# Patient Record
Sex: MALE | Race: WHITE
[De-identification: names, ages, dates, MRNs, and addresses within clinical notes are randomized per-mention and may not be internally consistent; named-entity substitution may affect disease eponyms.]

---

## 2021-05-03 ENCOUNTER — HOSPITAL ENCOUNTER (OUTPATIENT)
Dept: HOSPITAL 96 - M.CRD | Age: 46
End: 2021-05-03
Attending: INTERNAL MEDICINE
Payer: COMMERCIAL

## 2021-05-03 DIAGNOSIS — E78.2: ICD-10-CM

## 2021-05-03 DIAGNOSIS — R07.2: ICD-10-CM

## 2021-05-03 DIAGNOSIS — I25.10: Primary | ICD-10-CM

## 2021-05-03 DIAGNOSIS — I10: ICD-10-CM

## 2021-05-03 NOTE — 2DMMODE
[place] [address]
Phone:  [phone]                     2 D/M-MODE ECHOCARDIOGRAM     
_______________________________________________________________________________
 
Name:         JON MCADAMS Room:                     REG CLI
M.RLisseth#:    [id number]     Account #:     [id number]  
Admission:    21    Attend Phys:   Won Claros,
Discharge:                Date of Birth: [birth year]  
Date of Service: 21 1354  Report #:      [id number]
        [id number]
_______________________________________________________________________________
THIS REPORT FOR:
 
cc:  Virgen Morocho NP, Elizabeth NP Blick, David R. MD [facility]        
                                                                       ~
 
--------------- APPROVED REPORT --------------
 
 
Study performed:  2021 07:47:35
 
EXAM: Comprehensive 2D, Doppler, and color-flow 
Echocardiogram 
Patient Location: Out-Patient   
 
      BSA:         2.13
HR: 60 bpm BP:          130/82 mmHg 
 
Other Information 
Study Quality: Excellent
 
Indications
CAD
Chest Pain
 
2D Dimensions
IVSd:  11.29 (7-11mm) LVOT Diam:  20.63 (18-24mm) 
LVDd:  44.02 mm  
PWd:  10.04 (7-11mm) Ascending Ao:  31.56 (22-36mm)
LVDs:  25.87 (25-40mm) 
Aortic Root:  33.07 mm 
 
Volumes
Left Atrial Volume (Systole) 
    LA ESV Index:  17.00 mL/m2
 
Aortic Valve
AoV Peak Amador.:  1.47 m/s 
AO Peak Gr.:  8.68 mmHg  LVOT Max PG:  3.47 mmHg
AO Mean Gr.:  4.32 mmHg  LVOT Mean P.61 mmHg
    LVOT Max V:  0.93 m/s
AO V2 VTI:  28.88 cm  LVOT Mean V:  0.58 m/s
JOE (VTI):  2.39 cm2  LVOT V1 VTI:  20.64 cm
 
Mitral Valve
 
 
[place] [address]
Phone:  [phone]                     2 D/M-MODE ECHOCARDIOGRAM     
_______________________________________________________________________________
 
Name:         JON MCADAMS Room:                     REG CLI
M.R.#:    [id number]     Account #:     [id number]  
Admission:    21    Attend Phys:   Won Claros,
Discharge:                Date of Birth: [birth year]  
Date of Service: 21 1354  Report #:      [id number]
        [id number]
_______________________________________________________________________________
    E/A Ratio:  0.98
    MV Decel. Time:  203.05 ms
MV E Max Amador.:  0.80 m/s 
MV PHT:  58.88 ms  
MVA (PHT):  3.74 cm2  
 
TDI
E/Lateral E':  6.67 E/Medial E':  8.00
   Medial E' Amador.:  0.10 m/s
   Lateral E' Amador.:  0.12 m/s
 
Pulmonary Valve
PV Peak Amador.:  1.10 m/s PV Peak Gr.:  4.87 mmHg
 
Left Ventricle
The left ventricle is normal size. There is normal LV segmental wall 
motion. There is normal left ventricular wall thickness. Left 
ventricular systolic function is normal. The left ventricular 
ejection fraction is within the normal range. LVEF is 55-60%. Grade I 
- abnormal relaxation pattern.
 
Right Ventricle
The right ventricle is normal size. The right ventricular systolic 
function is normal.
 
Atria
The left atrium size is normal. Atrial septal aneurysm is present. 
Possible PFO is noted. Doppler suggests left to right interatrial 
shunt. The right atrium size is normal.
 
Aortic Valve
The aortic valve is normal in structure. No aortic regurgitation is 
present. There is no aortic valvular stenosis.
 
Mitral Valve
The mitral valve is normal in structure. There is trace mitral valve 
regurgitation noted. No evidence of mitral valve stenosis.
 
Tricuspid Valve
The tricuspid valve is normal in structure. There is trace tricuspid 
valve regurgitation noted.
 
Pulmonic Valve
The pulmonary valve is normal in structure. There is no pulmonic 
valvular regurgitation.
 
 
 
[place] [address]
Phone:  [phone]                     2 D/M-MODE ECHOCARDIOGRAM     
_______________________________________________________________________________
 
Name:         JON MCADAMS Room:                     REG CLI
M.R.#:    [id number]     Account #:     [id number]  
Admission:    21    Attend Phys:   Won Claros,
Discharge:                Date of Birth: [birth year]  
Date of Service: 21 1354  Report #:      [id number]
        [id number]
_______________________________________________________________________________
Great Vessels
The aortic root is normal in size. IVC is normal in size and 
collapses >50% with inspiration.
 
Pericardium
There is no pericardial effusion.
 
<Conclusion>
Atrial septal aneurysm is present.
Possible PFO is noted.
Doppler suggests left to right interatrial shunt.
LVEF is 55-60%.
 
 
 
 
 
 
 
 
 
 
 
 
 
 
 
 
 
 
 
 
 
 
 
 
 
 
 
 
 
 
 
 
  <ELECTRONICALLY SIGNED>
                                           By: Richmond Malloy MD, [facility]      
  21     1354
D: 21 1354   _____________________________________
T: 21 1354   Richmond Malloy MD, FACC        /INF

## 2021-05-03 NOTE — CARDNUC
[place] [address]
Phone:  [phone]                     CARDIAC NUCLEAR IMAGING REPORT
_______________________________________________________________________________
 
Name:         JON MCADAMS Room:                     REG CLI
M.R.#:    [id number]     Account #:     [id number]  
Admission:    05/03/21    Attend Phys:   Won Claros,
Discharge:                Date of Birth: [birth year]  
Date of Service: 05/03/21 1644  Report #:      [id number]
        [id number] 
_______________________________________________________________________________
THIS REPORT FOR:
 
cc:  Virgen Morocho NP, Elizabeth NP Liston, Michael J. MD [facility]     
                                                                       ~
 
--------------- APPROVED REPORT --------------
 
 
Imaging Protocol: Rest Tc-99m/Stress Tc-99m 1 day
Study performed:  05/03/2021 08:30:00
 
Indication: CHEST PAIN, INCREASED CORONARY CALCIUM SCORE.
Patient Location: Out-Patient
Stress Tech: Lisa Casey
Stress Nurse: Kymberly Fonseca RN
NM Tech:KERVIN Mccall
 
Ht: 6 ft 0 in     Wt: 199 lbs    BSA:  2.13 m2
HR: 64 bpm                       BP: 145/104 mmHg          BMI:  
26.98
 
Medical History
Medical History: CAD, INCREASED CORONARY CALCIUM SCORE, CHEST PAIN, 
HTN, HLD, FORMER SMOKER, FHX CAD.
Medications: ATORVASTATIN,LOSARTAN, FISH OIL OMEGA 3S, ASA 
81MG.
Allergies: ZITHROMYACIN, TAMIFLU
Cardiac Risk Factors: Age, Tobacco History (Former), Hyperlipidemia, 
HTN, FHX of CAD, INCREASED CORONARY CALCIUM SCORE-711
Previous Cardiac Procedures: NONE
Pretest Chest Pain Characteristics: No chest pain
Exercise History: Physically active
Physical Disabilities: NONE NOTED
Meds Held (24 hrs): LOSARTAN.
 
Resting Data
Rest SPECT myocardial perfusion imaging was performed in supine 
position 30 minutes following the intravenous injection of 9.9 mCi of 
Tc-99m Sestamibi.
Time of rest injection: 0855     Date: 05/03/2021
The images were gated to evaluate regional wall motion and calculate 
left ventricular ejection fraction. 
Administration Route: IV
Administration Site: Right Hand
 
 
[place] [address]
Phone:  [phone]                     CARDIAC NUCLEAR IMAGING REPORT
_______________________________________________________________________________
 
Name:         JON MCADAMS Room:                     REG CLI
M.R.#:    [id number]     Account #:     [id number]  
Admission:    05/03/21    Attend Phys:   Won Claros,
Discharge:                Date of Birth: [birth year]  
Date of Service: 05/03/21 1644  Report #:      [id number]
        [id number] 
_______________________________________________________________________________
 
Exercise Stress
At peak stress, the patient was injected intravenously with 35.5mCi 
of Tc-99m Sestamibi.
Time of stress injection: 1035     Date: 05/03/2021
Administration Route: IV
Administration Site: Right Hand
Gated Stress SPECT was performed 30 minutes after stress 
injection.
The images were gated to evaluate regional wall motion and calculate 
left ventricular ejection fraction. 
Prone imaging was performed.
 
Stress Test Details
Stress Test:  Exercise stress testing was performed using a Derrick 
protocol.      
 
HR      Max Heart Rate (APMHR): 172 bpm  
Resting HR:            64 bpm   Target HR (85% APMHR): 146 bpm  
Max HR Achieved:  167 bpm        
% of APMHR:         97         
Recovery HR:            93 bpm        
 
BP           
Resting BP:  145/104 mmHg        
Max BP:       213/95 mmHg        
Recovery BP:       144/85 mmHg        
ECG           
Resting ECG:  Sinus Rhythm        
Stress ECG:     Sinus Tachycardia       
ST Change: None          
Arrhythmia:    None         
Recovery ECG: Sinus Rhythm        
Recovery ST Change: None        
Recovery Arrhythmia: None        
 
Clinical
Reason for Termination: Completed protocol, Maximal effort, TARGET HR 
ACHIEVED.
Stress Symptoms: DYSPNEA.
Exercise duration: 13 min 22 sec
Exercise capacity: 14.41 METs
Overall Exercise Capacity for Age: Superior
Functional Aerobic Impairment  97%
Patient had no significant cardiac symptoms with standard Derrick 
protocol exercise. The patient exhibited excellent exercise 
 
 
[place] [address]
Phone:  [phone]                     CARDIAC NUCLEAR IMAGING REPORT
_______________________________________________________________________________
 
Name:         JON MCADAMS Room:                     REG CLI
M.R.#:    [id number]     Account #:     [id number]  
Admission:    05/03/21    Attend Phys:   Won Claros,
Discharge:                Date of Birth: [birth year]  
Date of Service: 05/03/21 1644  Report #:      [id number]
        [id number] 
_______________________________________________________________________________
tolerance.
 
Nurse Comments
A 46 YEAR OLD MALE PRESENTED FOR A TREADMILL NUCLEAR STRESS TEST. 
TREADMILL TOLERATED TO STAGE 5. RECOVERY UNREMARKABLE. PATIENT WAS 
STABLE AND STATED HE FELT GOOD WHEN ESCORTED TO NUCLEAR MEDICINE FOR 
IMAGING. EXERCISE CAPACITY - SUPERIOR
 
Stress ECG Conclusion
Baseline twelve-lead EKG shows sinus rhythm without significant ST 
segment or T wave abnormality.  EKGs obtained during and post 
exercise shows sinus rhythm and sinus tachycardia with no significant 
ST segment or T wave changes when compared to baseline.  There were 
no stress-induced arrhythmias.
 
Study Quality
Study: Good
Artifact: Mild Diaphragmatic artifact
 
Study Data
At rest, the left ventricular ejection fraction was 62%..   
Post stress, the left ventricular ejection was 65%..   
TID = 0.88.       
 
Perfusion
Perfusion images obtained in the supine position show mild photopenia 
in the inferior wall that resolved completely with post-rest prone 
imaging consistent with diaphragmatic attenuation artifact.  No other 
significant fixed or reversible defects were identified.
 
Wall Motion
Normal left ventricular wall motion.
 
Nuclear Conclusion
ECG Findings: negative for ischemia 
Clinical Findings: negative for ischemia 
Nuclear Findings: negative for ischemia 
Exercise Capacity: normal
Left Ventricular Function: normal 
Risk Study: low
Perfusion images show no defect to suggest infarct or ischemia.  Left 
ventricular systolic function appears normal on gated studies.  This 
is a low risk study. 
 
<Conclusion>
Baseline twelve-lead EKG shows sinus rhythm without significant ST 
 
 
[place] [address]
Phone:  [phone]                     CARDIAC NUCLEAR IMAGING REPORT
_______________________________________________________________________________
 
Name:         JON MCADAMS Room:                     REG CLI
MLissethLisseth#:    [id number]     Account #:     [id number]  
Admission:    05/03/21    Attend Phys:   Won Claros,
Discharge:                Date of Birth: [birth year]  
Date of Service: 05/03/21 1644  Report #:      [id number]
        [id number] 
_______________________________________________________________________________
segment or T wave abnormality.  EKGs obtained during and post 
exercise shows sinus rhythm and sinus tachycardia with no significant 
ST segment or T wave changes when compared to baseline.  There were 
no stress-induced arrhythmias.
 
 
 
 
 
 
 
 
 
 
 
 
 
 
 
 
 
 
 
 
 
 
 
 
 
 
 
 
 
 
 
 
 
 
 
 
 
 
 
 
  <ELECTRONICALLY SIGNED>
                                           By: Won Claros MD, FACC   
  05/03/21     1644
D: 05/03/21 1644   _____________________________________
T: 05/03/21 1644   Won Claros MD, FACC     /INF